# Patient Record
Sex: FEMALE | Race: WHITE | URBAN - METROPOLITAN AREA
[De-identification: names, ages, dates, MRNs, and addresses within clinical notes are randomized per-mention and may not be internally consistent; named-entity substitution may affect disease eponyms.]

---

## 2021-09-07 ENCOUNTER — OFFICE VISIT (OUTPATIENT)
Dept: URBAN - METROPOLITAN AREA CLINIC 3 | Facility: CLINIC | Age: 31
End: 2021-09-07
Payer: COMMERCIAL

## 2021-09-07 DIAGNOSIS — H35.40 PERIPHERAL RETINAL DEGENERATION: ICD-10-CM

## 2021-09-07 DIAGNOSIS — H43.313 VITREOUS MEMBRANES AND STRANDS, BILATERAL: Primary | ICD-10-CM

## 2021-09-07 PROCEDURE — 99204 OFFICE O/P NEW MOD 45 MIN: CPT | Performed by: OPTOMETRIST

## 2021-09-07 ASSESSMENT — INTRAOCULAR PRESSURE
OD: 20
OS: 21

## 2021-09-07 NOTE — IMPRESSION/PLAN
Impression: Other visual disturbances: H53.8. Plan: Recommend Free LVC consult.
Impression: Peripheral retinal degeneration: H35.40. Plan: Discussed risk of retinal tears and detachment discussed. Retinal detachment symptoms discussed, and patient agreed to return immediately if new symptoms develop.
Impression: Vitreous membranes and strands, bilateral: H43.313. Plan: Reviewed the signs and symptoms of possible retinal detachment (flashes, floaters, change in peripheral vision, etc). Advised to contact us immediately for any of these or other new symptoms.
Monthly or less

## 2021-09-13 ENCOUNTER — OFFICE VISIT (OUTPATIENT)
Dept: URBAN - METROPOLITAN AREA CLINIC 4 | Facility: CLINIC | Age: 31
End: 2021-09-13
Payer: COMMERCIAL

## 2021-09-13 DIAGNOSIS — H53.8 OTHER VISUAL DISTURBANCES: Primary | ICD-10-CM

## 2021-09-13 ASSESSMENT — INTRAOCULAR PRESSURE
OS: 16
OD: 13

## 2021-09-13 ASSESSMENT — KERATOMETRY
OS: 42.63
OD: 42.13

## 2021-09-13 NOTE — IMPRESSION/PLAN
Impression: Other visual disturbances: H53.8. Plan: Laser Vision Correction Evaluation: Pentacam today shows regular astigmatism w/o signs of posterior corneal ectasia. Keratometry and pachymetry WNL. AR shows refractive error within corrective limits. Patient with reasonable expectations and no major contraindications for laser vision correction. Recommend formal evaluation for refractive surgery after discontinuing contact lenses for at least 2 weeks. Gave pricing and surgery dates available. RTC if interested.